# Patient Record
Sex: FEMALE | Race: AMERICAN INDIAN OR ALASKA NATIVE | NOT HISPANIC OR LATINO | Employment: OTHER | URBAN - METROPOLITAN AREA
[De-identification: names, ages, dates, MRNs, and addresses within clinical notes are randomized per-mention and may not be internally consistent; named-entity substitution may affect disease eponyms.]

---

## 2023-03-20 ENCOUNTER — HOSPITAL ENCOUNTER (EMERGENCY)
Facility: CLINIC | Age: 73
Discharge: HOME | End: 2023-03-20
Attending: EMERGENCY MEDICINE | Admitting: EMERGENCY MEDICINE
Payer: MEDICARE

## 2023-03-20 VITALS
RESPIRATION RATE: 16 BRPM | DIASTOLIC BLOOD PRESSURE: 73 MMHG | TEMPERATURE: 98.3 F | OXYGEN SATURATION: 98 % | HEIGHT: 66 IN | BODY MASS INDEX: 32.95 KG/M2 | HEART RATE: 80 BPM | WEIGHT: 205 LBS | SYSTOLIC BLOOD PRESSURE: 162 MMHG

## 2023-03-20 DIAGNOSIS — L08.9 LOCAL SKIN INFECTION: ICD-10-CM

## 2023-03-20 DIAGNOSIS — T14.8XXA WOUND INFECTION: ICD-10-CM

## 2023-03-20 DIAGNOSIS — S31.109A OPEN WOUND OF ABDOMINAL WALL WITH COMPLICATION, INITIAL ENCOUNTER: ICD-10-CM

## 2023-03-20 DIAGNOSIS — N18.6 ESRD (END STAGE RENAL DISEASE) ON DIALYSIS (H): ICD-10-CM

## 2023-03-20 DIAGNOSIS — L08.9 WOUND INFECTION: ICD-10-CM

## 2023-03-20 DIAGNOSIS — E87.5 HYPERKALEMIA: ICD-10-CM

## 2023-03-20 DIAGNOSIS — Z99.2 ESRD (END STAGE RENAL DISEASE) ON DIALYSIS (H): ICD-10-CM

## 2023-03-20 LAB
ANION GAP SERPL CALCULATED.3IONS-SCNC: 9 MMOL/L (ref 7–15)
BASOPHILS # BLD AUTO: 0.1 10E3/UL (ref 0–0.2)
BASOPHILS NFR BLD AUTO: 1 %
BUN SERPL-MCNC: 18.6 MG/DL (ref 8–23)
CALCIUM SERPL-MCNC: 8.5 MG/DL (ref 8.8–10.2)
CHLORIDE SERPL-SCNC: 93 MMOL/L (ref 98–107)
CREAT SERPL-MCNC: 5.43 MG/DL (ref 0.51–0.95)
CRP SERPL-MCNC: 12.12 MG/L
DEPRECATED HCO3 PLAS-SCNC: 30 MMOL/L (ref 22–29)
EOSINOPHIL # BLD AUTO: 0.3 10E3/UL (ref 0–0.7)
EOSINOPHIL NFR BLD AUTO: 4 %
ERYTHROCYTE [DISTWIDTH] IN BLOOD BY AUTOMATED COUNT: 16.1 % (ref 10–15)
GFR SERPL CREATININE-BSD FRML MDRD: 8 ML/MIN/1.73M2
GLUCOSE SERPL-MCNC: 99 MG/DL (ref 70–99)
HCT VFR BLD AUTO: 30.9 % (ref 35–47)
HGB BLD-MCNC: 9.4 G/DL (ref 11.7–15.7)
HOLD SPECIMEN: NORMAL
IMM GRANULOCYTES # BLD: 0 10E3/UL
IMM GRANULOCYTES NFR BLD: 0 %
LYMPHOCYTES # BLD AUTO: 1.8 10E3/UL (ref 0.8–5.3)
LYMPHOCYTES NFR BLD AUTO: 26 %
MCH RBC QN AUTO: 27.7 PG (ref 26.5–33)
MCHC RBC AUTO-ENTMCNC: 30.4 G/DL (ref 31.5–36.5)
MCV RBC AUTO: 91 FL (ref 78–100)
MONOCYTES # BLD AUTO: 0.7 10E3/UL (ref 0–1.3)
MONOCYTES NFR BLD AUTO: 10 %
NEUTROPHILS # BLD AUTO: 4.1 10E3/UL (ref 1.6–8.3)
NEUTROPHILS NFR BLD AUTO: 59 %
NRBC # BLD AUTO: 0 10E3/UL
NRBC BLD AUTO-RTO: 0 /100
NT-PROBNP SERPL-MCNC: ABNORMAL PG/ML (ref 0–900)
PLATELET # BLD AUTO: 196 10E3/UL (ref 150–450)
POTASSIUM SERPL-SCNC: 6 MMOL/L (ref 3.4–5.3)
POTASSIUM SERPL-SCNC: 6.7 MMOL/L (ref 3.4–5.3)
RBC # BLD AUTO: 3.39 10E6/UL (ref 3.8–5.2)
SODIUM SERPL-SCNC: 132 MMOL/L (ref 136–145)
WBC # BLD AUTO: 7 10E3/UL (ref 4–11)

## 2023-03-20 PROCEDURE — 86140 C-REACTIVE PROTEIN: CPT | Performed by: EMERGENCY MEDICINE

## 2023-03-20 PROCEDURE — 87106 FUNGI IDENTIFICATION YEAST: CPT | Performed by: EMERGENCY MEDICINE

## 2023-03-20 PROCEDURE — 36415 COLL VENOUS BLD VENIPUNCTURE: CPT | Performed by: EMERGENCY MEDICINE

## 2023-03-20 PROCEDURE — 82310 ASSAY OF CALCIUM: CPT | Performed by: EMERGENCY MEDICINE

## 2023-03-20 PROCEDURE — 99284 EMERGENCY DEPT VISIT MOD MDM: CPT | Performed by: EMERGENCY MEDICINE

## 2023-03-20 PROCEDURE — 84132 ASSAY OF SERUM POTASSIUM: CPT | Mod: 91 | Performed by: EMERGENCY MEDICINE

## 2023-03-20 PROCEDURE — 83880 ASSAY OF NATRIURETIC PEPTIDE: CPT | Performed by: EMERGENCY MEDICINE

## 2023-03-20 PROCEDURE — 250N000013 HC RX MED GY IP 250 OP 250 PS 637: Performed by: EMERGENCY MEDICINE

## 2023-03-20 PROCEDURE — 87077 CULTURE AEROBIC IDENTIFY: CPT | Performed by: EMERGENCY MEDICINE

## 2023-03-20 PROCEDURE — 85025 COMPLETE CBC W/AUTO DIFF WBC: CPT | Performed by: EMERGENCY MEDICINE

## 2023-03-20 RX ORDER — CALCIUM ACETATE 667 MG/1
667 TABLET ORAL
COMMUNITY

## 2023-03-20 RX ORDER — LEVOFLOXACIN 500 MG/1
500 TABLET, FILM COATED ORAL ONCE
Status: DISCONTINUED | OUTPATIENT
Start: 2023-03-20 | End: 2023-03-20 | Stop reason: HOSPADM

## 2023-03-20 RX ORDER — LEVOFLOXACIN 250 MG/1
250 TABLET, FILM COATED ORAL EVERY OTHER DAY
Qty: 5 TABLET | Refills: 0 | Status: SHIPPED | OUTPATIENT
Start: 2023-03-22 | End: 2023-04-01

## 2023-03-20 RX ORDER — QUETIAPINE FUMARATE 300 MG/1
300 TABLET, FILM COATED ORAL
COMMUNITY

## 2023-03-20 RX ORDER — AMLODIPINE BESYLATE 10 MG/1
10 TABLET ORAL
COMMUNITY
Start: 2023-03-06 | End: 2024-03-10

## 2023-03-20 RX ORDER — ALBUTEROL SULFATE 90 UG/1
1-2 AEROSOL, METERED RESPIRATORY (INHALATION)
COMMUNITY
Start: 2022-12-27 | End: 2024-01-01

## 2023-03-20 RX ORDER — ACETAMINOPHEN 325 MG/1
650 TABLET ORAL
COMMUNITY
Start: 2022-11-28

## 2023-03-20 RX ORDER — CLOPIDOGREL BISULFATE 75 MG/1
75 TABLET ORAL
COMMUNITY
Start: 2022-12-19

## 2023-03-20 RX ORDER — ATORVASTATIN CALCIUM 80 MG/1
80 TABLET, FILM COATED ORAL
COMMUNITY
Start: 2022-11-28 | End: 2023-12-03

## 2023-03-20 RX ORDER — ASPIRIN 81 MG/1
81 TABLET, CHEWABLE ORAL
COMMUNITY
Start: 2022-11-29

## 2023-03-20 RX ORDER — CHOLESTYRAMINE 4 G/9G
4 POWDER, FOR SUSPENSION ORAL
COMMUNITY
Start: 2023-01-09 | End: 2024-01-14

## 2023-03-20 RX ORDER — METOPROLOL SUCCINATE 100 MG/1
100 TABLET, EXTENDED RELEASE ORAL
COMMUNITY
Start: 2023-02-13 | End: 2024-02-18

## 2023-03-20 RX ADMIN — SODIUM ZIRCONIUM CYCLOSILICATE 10 G: 5 POWDER, FOR SUSPENSION ORAL at 16:19

## 2023-03-20 ASSESSMENT — ACTIVITIES OF DAILY LIVING (ADL)
ADLS_ACUITY_SCORE: 35
ADLS_ACUITY_SCORE: 35

## 2023-03-20 NOTE — ED PROVIDER NOTES
History     Chief Complaint   Patient presents with     Wound Check     Patient reports wound to right lower abdomen present for 1 month. Patient has been having it cleaned and dressings changed by a public health nurse. Patient reports wound getting worse and possibly infected.     HPI  Siobhan Montoya is a 73 year old female dialysis patient here visiting in town who presents to the emergency department with complaints of an abdominal wound that seems to be worsening despite the local wound care of home from her daughter.  Patient states she is here visiting for the next 2 weeks and is using a dialysis center here locally for which she is scheduled for dialysis tomorrow.  Patient states her dry weight is 92 kg and that she underwent dialysis yesterday and got down to 91 kg.  Patient states she is scheduled for dialysis again tomorrow.  Patient denies any fevers any vomiting any shortness of breath but is concerned about the abdominal wound.  Patient states she is uncertain how the wound started.  Patient states she has been on antibiotics intermittently for this wound in the past.      I have reviewed the Medications, Allergies, Past Medical and Surgical History, and Social History in the Epic system.    History reviewed. No pertinent past medical history.    History reviewed. No pertinent surgical history.           Dose / Directions   acetaminophen 325 MG tablet  Commonly known as: TYLENOL      Dose: 650 mg  Take 650 mg by mouth  Refills: 0     albuterol 108 (90 Base) MCG/ACT inhaler  Commonly known as: PROAIR HFA/PROVENTIL HFA/VENTOLIN HFA      Dose: 1-2 puff  Inhale 1-2 puffs into the lungs  Refills: 0     amLODIPine 10 MG tablet  Commonly known as: NORVASC      Dose: 10 mg  Take 10 mg by mouth  Refills: 0     aspirin 81 MG chewable tablet  Commonly known as: ASA      Dose: 81 mg  Take 81 mg by mouth  Refills: 0     atorvastatin 80 MG tablet  Commonly known as: LIPITOR      Dose: 80 mg  Take 80 mg by  "mouth  Refills: 0     calcium acetate 667 MG Tabs tablet  Commonly known as: CALPHRON      Dose: 667 mg  Take 667 mg by mouth  Refills: 0     cholestyramine 4 g packet  Commonly known as: QUESTRAN      Dose: 4 g  Take 4 g by mouth  Refills: 0     clopidogrel 75 MG tablet  Commonly known as: PLAVIX      Dose: 75 mg  Take 75 mg by mouth  Refills: 0     dulaglutide 1.5 MG/0.5ML pen  Commonly known as: TRULICITY      Dose: 1.5 mg  Inject 1.5 mg Subcutaneous  Refills: 0     metoprolol succinate  MG 24 hr tablet  Commonly known as: TOPROL XL      Dose: 100 mg  Take 100 mg by mouth  Refills: 0     QUEtiapine 300 MG tablet  Commonly known as: SEROquel      Dose: 300 mg  Take 300 mg by mouth  Refills: 0                Past medical history, past surgical history, medications, and allergies were reviewed with the patient. Additional pertinent items: None    History reviewed. No pertinent family history.    Social History     Tobacco Use     Smoking status: Never     Smokeless tobacco: Never   Substance Use Topics     Alcohol use: Not Currently     Social history was reviewed with the patient. Additional pertinent items: None    Allergies   Allergen Reactions     Sulfa Drugs Unknown       Review of Systems  A medically appropriate review of systems was performed with pertinent positives and negatives noted in the HPI, and all other systems negative. and A complete review of systems was performed with pertinent positives and negatives noted in the HPI, and all other systems negative.    Physical Exam   BP: (!) 158/68  Pulse: 78  Temp: 98.3  F (36.8  C)  Resp: 18  Height: 167.6 cm (5' 6\")  Weight: 93 kg (205 lb)  SpO2: 99 %      Physical Exam  Vitals and nursing note reviewed.   Constitutional:       General: She is not in acute distress.  HENT:      Head: Atraumatic.   Eyes:      Extraocular Movements: Extraocular movements intact.      Pupils: Pupils are equal, round, and reactive to light.   Musculoskeletal:      " Cervical back: Neck supple.   Skin:     Comments: Patient has a wound on the abdomen that was slightly debrided by me and culture was taken and sent to the lab.  Please see picture below   Neurological:      General: No focal deficit present.      Mental Status: She is alert and oriented to person, place, and time.   Psychiatric:         Mood and Affect: Mood normal.             ED Course        Procedures              Results for orders placed or performed during the hospital encounter of 03/20/23 (from the past 24 hour(s))   CRP inflammation   Result Value Ref Range    CRP Inflammation 12.12 (H) <5.00 mg/L   Basic metabolic panel   Result Value Ref Range    Sodium 132 (L) 136 - 145 mmol/L    Potassium 6.7 (HH) 3.4 - 5.3 mmol/L    Chloride 93 (L) 98 - 107 mmol/L    Carbon Dioxide (CO2) 30 (H) 22 - 29 mmol/L    Anion Gap 9 7 - 15 mmol/L    Urea Nitrogen 18.6 8.0 - 23.0 mg/dL    Creatinine 5.43 (H) 0.51 - 0.95 mg/dL    Calcium 8.5 (L) 8.8 - 10.2 mg/dL    Glucose 99 70 - 99 mg/dL    GFR Estimate 8 (L) >60 mL/min/1.73m2   CBC with platelets differential    Narrative    The following orders were created for panel order CBC with platelets differential.  Procedure                               Abnormality         Status                     ---------                               -----------         ------                     CBC with platelets and d...[[id number]]  Abnormal            Final result                 Please view results for these tests on the individual orders.   CBC with platelets and differential   Result Value Ref Range    WBC Count 7.0 4.0 - 11.0 10e3/uL    RBC Count 3.39 (L) 3.80 - 5.20 10e6/uL    Hemoglobin 9.4 (L) 11.7 - 15.7 g/dL    Hematocrit 30.9 (L) 35.0 - 47.0 %    MCV 91 78 - 100 fL    MCH 27.7 26.5 - 33.0 pg    MCHC 30.4 (L) 31.5 - 36.5 g/dL    RDW 16.1 (H) 10.0 - 15.0 %    Platelet Count 196 150 - 450 10e3/uL    % Neutrophils 59 %    % Lymphocytes 26 %    % Monocytes 10 %    % Eosinophils 4 %     % Basophils 1 %    % Immature Granulocytes 0 %    NRBCs per 100 WBC 0 <1 /100    Absolute Neutrophils 4.1 1.6 - 8.3 10e3/uL    Absolute Lymphocytes 1.8 0.8 - 5.3 10e3/uL    Absolute Monocytes 0.7 0.0 - 1.3 10e3/uL    Absolute Eosinophils 0.3 0.0 - 0.7 10e3/uL    Absolute Basophils 0.1 0.0 - 0.2 10e3/uL    Absolute Immature Granulocytes 0.0 <=0.4 10e3/uL    Absolute NRBCs 0.0 10e3/uL   Potassium   Result Value Ref Range    Potassium 6.0 (H) 3.4 - 5.3 mmol/L   Extra Tube ([place] Draw)    Narrative    The following orders were created for panel order Extra Tube ([place] Draw).  Procedure                               Abnormality         Status                     ---------                               -----------         ------                     Extra Red Top Tube[[id number]]                               Final result                 Please view results for these tests on the individual orders.   Extra Red Top Tube   Result Value Ref Range    Hold Specimen [facility]    Nt probnp inpatient   Result Value Ref Range    N terminal Pro BNP Inpatient 32,818 (H) 0 - 900 pg/mL   Extra Tube *Canceled*    Narrative    The following orders were created for panel order Extra Tube.  Procedure                               Abnormality         Status                     ---------                               -----------         ------                     Extra Red Top Tube[[id number]]                                                            Please view results for these tests on the individual orders.     Orders Placed This Encounter   Procedures     CRP inflammation     Basic metabolic panel     CBC with platelets and differential     Potassium     Extra Tube ([place] Draw)     Extra Red Top Tube     Nt probnp inpatient     Primary Care Referral     CBC with platelets differential   Wound culture sent to the lab as well        Medications   sodium zirconium cyclosilicate (LOKELMA) packet 10 g (has no administration in time range)    levofloxacin (LEVAQUIN) tablet 500 mg (has no administration in time range)         Assessments & Plan (with Medical Decision Making)     I have reviewed the nursing notes.    Patient was given a dose of Lokelma here in the ER and is scheduled for dialysis tomorrow morning.  Patient was also given a dose of Levaquin here as the wound smelled like Pseudomonas.  Culture pending.    Patient will be set up with a primary care referral while she is in town here to further manage her wound and to help coordinate any dialysis difficulties.    I have reviewed the findings, diagnosis, plan and need for follow up with the patient.    New Prescriptions    LEVOFLOXACIN (LEVAQUIN) 250 MG TABLET    Take 1 tablet (250 mg) by mouth every other day for 10 days    SODIUM ZIRCONIUM CYCLOSILICATE (LOKELMA) 10 G PACK PACKET    Take 1 packet (10 g) by mouth once for 1 dose Take today when prescription filled       Final diagnoses:   Wound infection - RLQ abdominal wall   ESRD (end stage renal disease) on dialysis (H) - with hyperkalemia     Clean wound twice daily with a mixture of hydrogen peroxide and water mixed 50-50.  Wet a Q-tip and roll the Q-tip over the wound twice daily.    Fill your prescriptions and take as directed.    Follow-up with dialysis tomorrow and as scheduled    A referral has been placed into the computer system for you to be seen in a primary care clinic in the next two days so they can check your wound culture results and review your antibiotic treatment, wound care and coordination of dialysis.  They should call you in the next 24 hours to help you set up an appointment to be seen.  If they do not call you please call them at [facility] (phone: [phone]).    Routine discharge instructions were given for these diagnoses      BRI CALVERT MD    3/20/2023   [facility] EMERGENCY DEPARTMENT     Bri Calvert MD  03/20/23 1212

## 2023-03-20 NOTE — DISCHARGE INSTRUCTIONS
Clean wound twice daily with a mixture of hydrogen peroxide and water mixed 50-50.  Wet a Q-tip and roll the Q-tip over the wound twice daily.    Fill your prescriptions and take as directed.    Follow-up with dialysis tomorrow and as scheduled    A referral has been placed into the computer system for you to be seen in a primary care clinic in the next two days so they can check your wound culture results and review your antibiotic treatment, wound care and coordination of dialysis.  They should call you in the next 24 hours to help you set up an appointment to be seen.  If they do not call you please call them at [facility] (phone: [phone]).

## 2023-03-20 NOTE — ED NOTES
Md approved for pt to take first dose of antibiotic prescribed home and writer informed pt to take in two hours. Medication needed to be taken 2 hours after other med that was given in ED.

## 2023-03-23 ENCOUNTER — TELEPHONE (OUTPATIENT)
Dept: EMERGENCY MEDICINE | Facility: CLINIC | Age: 73
End: 2023-03-23
Payer: MEDICARE

## 2023-03-23 LAB
BACTERIA WND CULT: ABNORMAL

## 2023-03-23 NOTE — TELEPHONE ENCOUNTER
"Siobhan returned call.  She was notified of culture result  She reports she just picked up antibiotic yesterday  \"Wound is looking better today\".    She states she will be following up with the wound doctor on Monday  Encouraged to continue antibiotic    Contact your PCP clinic or return to the Emergency department if your:    Symptoms worsen or other concerning symptom's.    Nando Novoa RN  [employer] [facility]  Emergency Dept Lab Result RN  Ph# [phone]    "

## 2023-03-23 NOTE — TELEPHONE ENCOUNTER
[facility] () Emergency Department/Urgent Care Lab result notification  [Note:  ED Lab Results RN will reference the [facility] Emergency Dept visit note prior to contacting patient AND/OR prior to consulting Emergency Dept Provider.  Highlights of Emergency Dept visit in information summary at the bottom of this telephone note]    1. Reason for call    Notify the patient/parent of lab results    Assess patient symptoms    Review ED providers recommendations/discharge instructions (if necessary)    Advise per [facility] ED lab result protocol    2. Lab Result (including Rx patient on, if applicable).  Copy of lab result from [facility] at bottom of charting  Final Wound Aerobic Bacterial Culture (specimen - Abdomen; wound) report on 3/23/23  [facility] Emergency Dept discharge antibiotic prescribed: Levofloxacin (Levaquin) 250 mg PO tablet, 1 tablet (250 mg) by mouth every other day for 10 days.   #1. Bacteria, Escherichia coli,  is [SUSCEPTIBLE] to antibiotic Levaquin  #2. Bacteria, Staphylococcus epidermidis, susceptibilities not routinely done,  #3. Organism, Candida albicans, Candida albicans Susceptibilities not routinely done  Incision and Drainage performed in the [place] ED: NO  Recommendations in treatment per [facility] ED lab result culture protocol    3. RN Assessment (Patient's current Symptoms):    Time of call: 3/23/2023 5:16 PM    Assessment: patient phone note working - call to sister Francisco who gives patients phone #: [phone]    Erica (sister) phone #: [phone]  Left voicemail message requesting a call back to [facility] ED Lab Result RN at [phone].  RN is available every day between 9 a.m. and 5:30 p.m.      7. Copy of Lab result   Wound Aerobic Bacterial Culture Routine  Order: [id number]   Collected 3/20/2023 12:42 PM      Status: Final result      Visible to patient: No (inaccessible in MyChart)     Specimen Information:  Abdomen; Wound    4 Result Notes  Culture 1+ Escherichia coli Abnormal        4+ Staphylococcus epidermidis Abnormal     Susceptibilities not routinely done, refer to antibiogram to view typical susceptibility profiles   4+ Candida albicans Abnormal     Susceptibilities not routinely done, refer to antibiogram to view typical susceptibility profiles        Resulting Agency: [facility]     Susceptibility     Escherichia coli     [facility]AG[facility]     Ampicillin >=32 ug/mL Resistant     Ampicillin/ Sulbactam >=32 ug/mL Resistant     Cefepime <=1 ug/mL Susceptible     Ceftazidime 4 ug/mL Susceptible     Ceftriaxone <=1 ug/mL Susceptible     Ciprofloxacin <=0.25 ug/mL Susceptible     Gentamicin <=1 ug/mL Susceptible     Levofloxacin <=0.12 ug/mL Susceptible     Meropenem <=0.25 ug/mL Susceptible     Piperacillin/Tazobactam 8 ug/mL Susceptible     Tobramycin <=1 ug/mL Susceptible     Trimethoprim/Sulfamethoxazole <=1/19 ug/mL Susceptible                  Specimen Collected: 03/20/23 12:42 PM Last Resulted: 03/23/23  8:07 AM                 Ajith Ingram RN  [facility]  Emergency Dept Lab Result RN  Ph# [phone]